# Patient Record
Sex: MALE | Race: WHITE | NOT HISPANIC OR LATINO | Employment: STUDENT | URBAN - METROPOLITAN AREA
[De-identification: names, ages, dates, MRNs, and addresses within clinical notes are randomized per-mention and may not be internally consistent; named-entity substitution may affect disease eponyms.]

---
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
## 2025-02-17 ENCOUNTER — TELEPHONE (OUTPATIENT)
Dept: FAMILY MEDICINE CLINIC | Facility: CLINIC | Age: 20
End: 2025-02-17

## 2025-02-17 NOTE — TELEPHONE ENCOUNTER
1st call    Lvm that pcp has left practice and if pt would like to re-establish with another provider within the practice to call.